# Patient Record
Sex: MALE | Race: BLACK OR AFRICAN AMERICAN | NOT HISPANIC OR LATINO | URBAN - METROPOLITAN AREA
[De-identification: names, ages, dates, MRNs, and addresses within clinical notes are randomized per-mention and may not be internally consistent; named-entity substitution may affect disease eponyms.]

---

## 2019-03-18 PROBLEM — Z00.00 ENCOUNTER FOR PREVENTIVE HEALTH EXAMINATION: Status: ACTIVE | Noted: 2019-03-18

## 2021-11-15 ENCOUNTER — EMERGENCY (EMERGENCY)
Facility: HOSPITAL | Age: 61
LOS: 1 days | Discharge: HOME | End: 2021-11-15
Attending: EMERGENCY MEDICINE
Payer: MEDICAID

## 2021-11-15 VITALS
OXYGEN SATURATION: 98 % | HEIGHT: 64 IN | WEIGHT: 184.97 LBS | HEART RATE: 74 BPM | RESPIRATION RATE: 20 BRPM | SYSTOLIC BLOOD PRESSURE: 163 MMHG | TEMPERATURE: 98 F | DIASTOLIC BLOOD PRESSURE: 99 MMHG

## 2021-11-15 VITALS
OXYGEN SATURATION: 99 % | SYSTOLIC BLOOD PRESSURE: 162 MMHG | RESPIRATION RATE: 20 BRPM | HEART RATE: 58 BPM | TEMPERATURE: 98 F | DIASTOLIC BLOOD PRESSURE: 105 MMHG

## 2021-11-15 PROCEDURE — 99284 EMERGENCY DEPT VISIT MOD MDM: CPT

## 2021-11-15 PROCEDURE — 96372 THER/PROPH/DIAG INJ SC/IM: CPT

## 2021-11-15 PROCEDURE — 99283 EMERGENCY DEPT VISIT LOW MDM: CPT | Mod: 25

## 2021-11-15 RX ORDER — LIDOCAINE 4 G/100G
1 CREAM TOPICAL ONCE
Refills: 0 | Status: COMPLETED | OUTPATIENT
Start: 2021-11-15 | End: 2021-11-15

## 2021-11-15 RX ORDER — KETOROLAC TROMETHAMINE 30 MG/ML
15 SYRINGE (ML) INJECTION ONCE
Refills: 0 | Status: DISCONTINUED | OUTPATIENT
Start: 2021-11-15 | End: 2021-11-15

## 2021-11-15 RX ORDER — ACETAMINOPHEN 500 MG
650 TABLET ORAL ONCE
Refills: 0 | Status: COMPLETED | OUTPATIENT
Start: 2021-11-15 | End: 2021-11-15

## 2021-11-15 RX ADMIN — LIDOCAINE 1 PATCH: 4 CREAM TOPICAL at 11:16

## 2021-11-15 RX ADMIN — Medication 650 MILLIGRAM(S): at 11:16

## 2021-11-15 NOTE — ED PROVIDER NOTE - CLINICAL SUMMARY MEDICAL DECISION MAKING FREE TEXT BOX
Isabella Willis DO PGY-1  61 year old male with PMH HTN and back pain presents with right low back pain for 4 weeks, worse when standing up from sitting. No saddle anesthesia/urinary or stool incontinence. Neuro intact. Back pain likely musculoskeletal, will treat pain and re-evaluate. Likely discharge home with ortho/spine follow up. Isabella Willis DO PGY-1  61 year old male with PMH HTN and back pain presents with right low back pain for 4 weeks, worse when standing up from sitting. No saddle anesthesia/urinary or stool incontinence. Neuro intact. Back pain likely musculoskeletal, will treat pain and re-evaluate. Likely discharge home with ortho/spine follow up.    ROSELINE Andres MD: Agree with resident MDM, assessment and plan as above. PT p/w acute on chronic LBP. Denies focal numbness/weakness, bowel/bladder incontinence, saddle anesthesia, pain radiating down legs, IVDA, f/c. No red flag signs/sx concerning for cauda equina or cord compression. Has sought medical care for this before. PT ambulatory, normal neurologic exam. Plan: pain control, outpt spine f/u with return precautions

## 2021-11-15 NOTE — ED PROVIDER NOTE - OBJECTIVE STATEMENT
61 year old male with PMH HTN, back pain presents with 4 weeks of right sided low back pain, worse with standing up from sitting. Patient states pain is minimal at rest. Improves with walking. Was seen in [facility] 2 weeks ago and prescribed meloxicam 15 mg and methacarbamol 500 mg, which improved symptoms. Denies urinary incontinence, frequency, hematuria, stool incontinence, or saddle anesthesia. Denies fevers, chills, recreational drug use.     PMH: HTN  Meds: metoproplol, amlodipine, meloxicam, methacarbamol  NKDA

## 2021-11-15 NOTE — ED PROVIDER NOTE - PHYSICAL EXAMINATION
PHYSICAL EXAM:  CONSTITUTIONAL: Well appearing, awake, alert, oriented to person, place, time/situation and in no apparent distress.  HEAD: Atraumatic  NECK: Supple, full ROM.  EYES: Clear bilaterally, pupils equal, round and reactive to light.  BACK: No midline spinal tenderness.   ENMT: Airway patent, Nasal mucosa clear. Mouth with normal mucosa. Uvula is midline.   CARDIAC: Normal rate, regular rhythm. +S1/S2. No murmurs, rubs or gallops.  RESPIRATORY: Breathing unlabored.  NEUROLOGICAL: Alert and oriented, no focal deficits, no motor or sensory deficits. Sensation intact x4 extremities. Strength 5/5 of upper and lower extremities B/L.   SKIN: Skin warm and dry. No evidence of rashes or lesions.

## 2021-11-15 NOTE — ED PROVIDER NOTE - NSFOLLOWUPINSTRUCTIONS_ED_ALL_ED_FT
Call [facility] - [phone] for further evaluation and management     Back Pain  WHAT YOU NEED TO KNOW:  Back pain is common. It can be caused by many conditions, such as arthritis or the breakdown of spinal discs. Your risk for back pain is increased by injuries, lack of activity, or repeated bending and twisting. You may feel sore or stiff on one or both sides of your back. The pain may spread to your buttocks or thighs.  DISCHARGE INSTRUCTIONS:  Return to the emergency department if:   •You have pain, numbness, or weakness in one or both legs.  •Your pain becomes so severe that you cannot walk.  •You cannot control your urine or bowel movements.  •You have severe back pain with chest pain.  •You have severe back pain, nausea, and vomiting.  •You have severe back pain that spreads to your side or genital area.  Contact your healthcare provider if:   •You have back pain that does not get better with rest and pain medicine.  •You have a fever.  •You have pain that worsens when you are on your back or when you rest.  •You have pain that worsens when you cough or sneeze.  •You lose weight without trying.  •You have questions or concerns about your condition or care.  Medicines:   •NSAIDs help decrease swelling and pain. This medicine is available with or without a doctor's order. NSAIDs can cause stomach bleeding or kidney problems in certain people. If you take blood thinner medicine, always ask your healthcare provider if NSAIDs are safe for you. Always read the medicine label and follow directions.  •Acetaminophen decreases pain and fever. It is available without a doctor's order. Ask how much to take and how often to take it. Follow directions. Read the labels of all other medicines you are using to see if they also contain acetaminophen, or ask your doctor or pharmacist. Acetaminophen can cause liver damage if not taken correctly. Do not use more than 4 grams (4,000 milligrams) total of acetaminophen in one day.   •Muscle relaxers help decrease muscle spasms and back pain.  •Prescription pain medicine may be given. Ask your healthcare provider how to take this medicine safely. Some prescription pain medicines contain acetaminophen. Do not take other medicines that contain acetaminophen without talking to your healthcare provider. Too much acetaminophen may cause liver damage. Prescription pain medicine may cause constipation. Ask your healthcare provider how to prevent or treat constipation.   •Take your medicine as directed. Contact your healthcare provider if you think your medicine is not helping or if you have side effects. Tell him or her if you are allergic to any medicine. Keep a list of the medicines, vitamins, and herbs you take. Include the amounts, and when and why you take them. Bring the list or the pill bottles to follow-up visits. Carry your medicine list with you in case of an emergency.  How to manage your back pain:   •Apply ice on your back for 15 to 20 minutes every hour or as directed. Use an ice pack, or put crushed ice in a plastic bag. Cover it with a towel before you apply it to your skin. Ice helps prevent tissue damage and decreases pain.  •Apply heat on your back for 20 to 30 minutes every 2 hours for as many days as directed. Heat helps decrease pain and muscle spasms.  •Stay active as much as you can without causing more pain. Bed rest could make your back pain worse. Avoid heavy lifting until your pain is gone.  •Go to physical therapy as directed. A physical therapist can teach you exercises to help improve movement and strength, and to decrease pain.  Follow up with your healthcare provider in 2 weeks, or as directed: Write down your questions so you remember to ask them during your visits. Call [facility] - [phone] for further evaluation and management.    For pain, continue to take your prescribed medications.  Continue all home medications.  Additionally, you can purchase Lidocaine patches over the counter at your local pharmacy.    Back Pain  WHAT YOU NEED TO KNOW:  Back pain is common. It can be caused by many conditions, such as arthritis or the breakdown of spinal discs. Your risk for back pain is increased by injuries, lack of activity, or repeated bending and twisting. You may feel sore or stiff on one or both sides of your back. The pain may spread to your buttocks or thighs.  DISCHARGE INSTRUCTIONS:  Return to the emergency department if:   •You have pain, numbness, or weakness in one or both legs.  •Your pain becomes so severe that you cannot walk.  •You cannot control your urine or bowel movements.  •You have severe back pain with chest pain.  •You have severe back pain, nausea, and vomiting.  •You have severe back pain that spreads to your side or genital area.  Contact your healthcare provider if:   •You have back pain that does not get better with rest and pain medicine.  •You have a fever.  •You have pain that worsens when you are on your back or when you rest.  •You have pain that worsens when you cough or sneeze.  •You lose weight without trying.  •You have questions or concerns about your condition or care.  Medicines:   •NSAIDs help decrease swelling and pain. This medicine is available with or without a doctor's order. NSAIDs can cause stomach bleeding or kidney problems in certain people. If you take blood thinner medicine, always ask your healthcare provider if NSAIDs are safe for you. Always read the medicine label and follow directions.  •Acetaminophen decreases pain and fever. It is available without a doctor's order. Ask how much to take and how often to take it. Follow directions. Read the labels of all other medicines you are using to see if they also contain acetaminophen, or ask your doctor or pharmacist. Acetaminophen can cause liver damage if not taken correctly. Do not use more than 4 grams (4,000 milligrams) total of acetaminophen in one day.   •Muscle relaxers help decrease muscle spasms and back pain.  •Prescription pain medicine may be given. Ask your healthcare provider how to take this medicine safely. Some prescription pain medicines contain acetaminophen. Do not take other medicines that contain acetaminophen without talking to your healthcare provider. Too much acetaminophen may cause liver damage. Prescription pain medicine may cause constipation. Ask your healthcare provider how to prevent or treat constipation.   •Take your medicine as directed. Contact your healthcare provider if you think your medicine is not helping or if you have side effects. Tell him or her if you are allergic to any medicine. Keep a list of the medicines, vitamins, and herbs you take. Include the amounts, and when and why you take them. Bring the list or the pill bottles to follow-up visits. Carry your medicine list with you in case of an emergency.  How to manage your back pain:   •Apply ice on your back for 15 to 20 minutes every hour or as directed. Use an ice pack, or put crushed ice in a plastic bag. Cover it with a towel before you apply it to your skin. Ice helps prevent tissue damage and decreases pain.  •Apply heat on your back for 20 to 30 minutes every 2 hours for as many days as directed. Heat helps decrease pain and muscle spasms.  •Stay active as much as you can without causing more pain. Bed rest could make your back pain worse. Avoid heavy lifting until your pain is gone.  •Go to physical therapy as directed. A physical therapist can teach you exercises to help improve movement and strength, and to decrease pain.  Follow up with your healthcare provider in 2 weeks, or as directed: Write down your questions so you remember to ask them during your visits.

## 2021-11-15 NOTE — ED ADULT NURSE NOTE - OBJECTIVE STATEMENT
61 year old male with PMH HTN, back pain presents with 4 weeks of right sided low back pain, worse with standing up from sitting  PT is alert and oriented x 3.  Pt denies any sob or chest pain.  Denies any fevers, incontinence, or numbness.

## 2021-11-15 NOTE — ED PROVIDER NOTE - PATIENT PORTAL LINK FT
You can access the FollowMyHealth Patient Portal offered by [facility] by registering at the following website: [email or web address]. By joining [facility]’s FollowMyHealth portal, you will also be able to view your health information using other applications (apps) compatible with our system.

## 2021-11-15 NOTE — ED PROVIDER NOTE - NS ED ROS FT
ROS:  -Constitutional: Denies fever  -Head: Denies headache  -Eyes: Denies blurry vision  -Cardiovascular: Denies chest pain  -Pulmonary: Denies shortness of breath  -Gastrointestinal: Denies nausea  -Genitourinary: Denies dysuria  -Skin: Denies new rashes  -Neuro: Denies weakness
No

## 2021-11-15 NOTE — ED PROVIDER NOTE - PROGRESS NOTE DETAILS
Isabella Willis DO PGY-1  Spoke with patient with  ID [id number], Guatemalan creole speaking.  Patient states that symptoms are improved. Discussed plan to follow up with ortho spine clinic, continue home medications, and return precautions given. Isabella Willis DO PGY-1  Patient declined toradol. Isabella Willis DO PGY-1  Spoke with patient with  ID [id number], Georgian creole speaking.  Patient states that symptoms are improved. Discussed plan to follow up with ortho spine clinic, continue home medications, and return precautions given.

## 2021-11-17 PROBLEM — I10 ESSENTIAL (PRIMARY) HYPERTENSION: Chronic | Status: ACTIVE | Noted: 2021-11-15

## 2021-11-23 ENCOUNTER — APPOINTMENT (OUTPATIENT)
Dept: PHYSICAL MEDICINE AND REHAB | Facility: CLINIC | Age: 61
End: 2021-11-23
Payer: MEDICAID

## 2021-11-23 PROCEDURE — 99204 OFFICE O/P NEW MOD 45 MIN: CPT

## 2021-11-26 NOTE — HISTORY OF PRESENT ILLNESS
[FreeTextEntry1] : Portuguese Creole  (Taco) used for purposes of translation ([id number])\par 60 yo M with PMH HTN who presents with low back pain.\par \par Onset: several years and marked by periodic flares, the latest flare started one month ago.  No inciting events, trauma, or falls.\par Location: lower lumbar spine\par Characteristics: sharp \par Aggravating factors: prolonged sitting, standing, walking\par Alleviating factors: rest\par Radiation: right lower extremity\par Treatments: tylenol, NSAIDs, IM toradol injection ([facility] ED), meloxicam and methocarbamol ([facility]) with minimal relief of his pain.  rest, physical therapy, HEP with no improvement.  No previous injections.  No previous surgeries.\par Severity: 7-9/10\par \par Diagnostic studies:\par MRI was done 3 weeks ago but patient unable to recall the facility.  Patient provided me with the phone number for out-patient pain management but unable to retrieve the results on this visit.\par No previous EMG/NCS\par \par Patient denies new weakness, numbness or paresthesia.  Denies bowel/bladder dysfunction, saddle anesthesia, fevers, chills, weight loss, night pain, or night sweats.\par \par 
Patient requests all Lab and Radiology Results on their Discharge Instructions

## 2021-11-26 NOTE — ASSESSMENT
[FreeTextEntry1] : 60 yo M who presents with low back pain with radiation into the right lower extremity consistent with lumbar radiculopathy secondary to lumbosacral stenosis and lumbar disc herniation.\par \par I had an extended discussion with Mr. Phillips on this visit.  Various topics were covered including diagnosis, treatment options, and prognosis.  Relevant anatomy and treatment rationale reviewed.  Anticipatory guidance provided including instructions on how to manage future flares were discussed.  Lastly, red flag signs and symptoms were reviewed and patient instructed to seek immediate medical attention should these arise.\par \par I counseled Mr. Phillips that, at this point in treatment, I recommend undergoing MRI lumbar spine.  Mr. Phillips reports undergoing this study but is unable to recall the facility that he underwent this testing.  He has a phone number for a local pain management physician but we were unable to retrieve these results prior to the conclusion of his visit.\par \par -[facility] ED notes reviewed.\par -Will contact Mr. Phillips's previous pain management physician for a copy of the report for most recent MRI lumbar spine. I will schedule Mr. Phillips to have a follow up in 1-2 weeks to review these results.  I also have asked Mr. Phillips to bring with him a copy of the CD to review MRI imaging on next visit.\par -Start medrol dose pack, dispense 1 pack.  Patient warned to avoid use of PO NSAIDS while on oral steroids.  Possible side effects, including hyperglycemia, GI upset, and GI bleed, reviewed with patient.  Patient instructed to immediately stop medication should she develop any abdominal pain, nausea, vomiting, bloody stools, or BRBPR.  \par -Restart PT/HEP, new referral provided\par -RTC 1-2 weeks as above\par \par Yaya Hinojosa MD\par Spine and Sports Medicine\par \par [employer]\par At [facility]\par \par \par

## 2021-11-26 NOTE — PHYSICAL EXAM
[FreeTextEntry1] : Gen: NAD\par HEENT: neck supple\par CV: no cyanosis\par Pulm: breathing well on room air\par Abd: soft\par Low back: range of motion limited by pain, tenderness to palpation lower lumbar paraspinals and right sciatic notch, +straight leg raise RLE, neg FABERE, neg FAIR\par Right hip: FAROM, non-tender to palpation, neg scour, neg juaquin, neg nelda\par Msk: \par 5/5 hip flexion B/L, 5/5 knee extension B/L, 5/5 knee flexion B/L, 5/5 dorsiflexion B/L, 5/5 EHL B/L, 5/5 plantar flexion B/L\par 5/5 shoulder abduction B/L, 5/5 elbow flexion B/L, 5/5 elbow extension B/L, 5/5 wrist extension B/L, 5/5 hand  B/L\par Neuro: sensation intact to light touch in bilateral upper and lower extremities, reflexes 2+ brachioradialis, biceps, triceps bilaterally, reflexes 2+ patella, medial hamstring, achilles bilaterally, negative babinski, negative calloway\par

## 2021-12-07 ENCOUNTER — APPOINTMENT (OUTPATIENT)
Dept: PHYSICAL MEDICINE AND REHAB | Facility: CLINIC | Age: 61
End: 2021-12-07

## 2022-02-16 ENCOUNTER — APPOINTMENT (OUTPATIENT)
Dept: PHYSICAL MEDICINE AND REHAB | Facility: CLINIC | Age: 62
End: 2022-02-16
Payer: MEDICAID

## 2022-02-16 PROCEDURE — 99214 OFFICE O/P EST MOD 30 MIN: CPT

## 2022-02-16 RX ORDER — METHYLPREDNISOLONE 4 MG/1
4 TABLET ORAL
Qty: 1 | Refills: 0 | Status: ACTIVE | COMMUNITY
Start: 2021-11-23 | End: 1900-01-01

## 2022-02-16 NOTE — HISTORY OF PRESENT ILLNESS
[FreeTextEntry1] : Patient saw Dr. Hinojosa in November and was prescribed a Medrol pack for his lower back pain which helped significantly.  He is not interested in any injections.  He would like to take the same medication as his pain is starting to return.   [Back] : back [Other: ___] : [unfilled] [___ mths] : [unfilled] month(s) ago [6] : a current pain level of 6/10 [Sharp] : sharp [Laying] : laying [Insomnia] : insomnia [Gait Dysfunction] : gait dysfunction [PT] : PT [Medications] : medications [FreeTextEntry6] : Medrol pack

## 2022-02-16 NOTE — DATA REVIEWED
[MRI] : MRI [FreeTextEntry1] : MRI lumbar spine done September 2021 showed evidence of protrusion at L5-S1 with moderate neural foraminal stenosis.  Mild facet hypertrophy L3-S1.

## 2022-02-16 NOTE — ASSESSMENT
[FreeTextEntry1] : 62 year old male with low back pain.  I will prescribe Medrol pack for him as it has worked in the past.  Side effects discussed.

## 2022-04-19 ENCOUNTER — APPOINTMENT (OUTPATIENT)
Dept: PHYSICAL MEDICINE AND REHAB | Facility: CLINIC | Age: 62
End: 2022-04-19
Payer: MEDICAID

## 2022-04-19 VITALS
TEMPERATURE: 94.5 F | OXYGEN SATURATION: 99 % | SYSTOLIC BLOOD PRESSURE: 162 MMHG | DIASTOLIC BLOOD PRESSURE: 84 MMHG | HEART RATE: 70 BPM

## 2022-04-19 DIAGNOSIS — M51.36 OTHER INTERVERTEBRAL DISC DEGENERATION, LUMBAR REGION: ICD-10-CM

## 2022-04-19 DIAGNOSIS — M48.061 SPINAL STENOSIS, LUMBAR REGION WITHOUT NEUROGENIC CLAUDICATION: ICD-10-CM

## 2022-04-19 DIAGNOSIS — M54.16 RADICULOPATHY, LUMBAR REGION: ICD-10-CM

## 2022-04-19 PROCEDURE — 99213 OFFICE O/P EST LOW 20 MIN: CPT

## 2022-04-19 RX ORDER — GABAPENTIN 100 MG/1
100 CAPSULE ORAL
Qty: 90 | Refills: 2 | Status: ACTIVE | COMMUNITY
Start: 2022-04-19 | End: 1900-01-01

## 2022-04-19 NOTE — HISTORY OF PRESENT ILLNESS
[FreeTextEntry1] : Patient returns after being seen in February.  He states his pain has returned.  He is here to discuss his options.

## 2022-04-19 NOTE — ASSESSMENT
[FreeTextEntry1] : 62 year old male with low back and lumbar radicular pain now returned.  As he has already taken 2 medrol packs in the last 6 months, I will not prescribe any additional oral steroids for him.  He will begin with a course of medically supervised PT.  I will provide him with a prescription.  We will also begin a trial of Gabapentin.  Side effects discussed.

## 2022-10-11 ENCOUNTER — APPOINTMENT (OUTPATIENT)
Dept: GASTROENTEROLOGY | Facility: CLINIC | Age: 62
End: 2022-10-11

## 2024-07-26 ENCOUNTER — EMERGENCY (EMERGENCY)
Facility: HOSPITAL | Age: 64
LOS: 1 days | Discharge: HOME | End: 2024-07-26
Admitting: STUDENT IN AN ORGANIZED HEALTH CARE EDUCATION/TRAINING PROGRAM
Payer: SELF-PAY

## 2024-07-26 VITALS
WEIGHT: 190.04 LBS | OXYGEN SATURATION: 98 % | TEMPERATURE: 99 F | DIASTOLIC BLOOD PRESSURE: 104 MMHG | RESPIRATION RATE: 17 BRPM | HEART RATE: 77 BPM | SYSTOLIC BLOOD PRESSURE: 175 MMHG

## 2024-07-26 LAB
APPEARANCE UR: CLEAR — SIGNIFICANT CHANGE UP
BILIRUB UR-MCNC: NEGATIVE — SIGNIFICANT CHANGE UP
COLOR SPEC: YELLOW — SIGNIFICANT CHANGE UP
DIFF PNL FLD: NEGATIVE — SIGNIFICANT CHANGE UP
GLUCOSE UR QL: >=1000 MG/DL
KETONES UR-MCNC: NEGATIVE MG/DL — SIGNIFICANT CHANGE UP
LEUKOCYTE ESTERASE UR-ACNC: NEGATIVE — SIGNIFICANT CHANGE UP
NITRITE UR-MCNC: NEGATIVE — SIGNIFICANT CHANGE UP
PH UR: 7.5 — SIGNIFICANT CHANGE UP (ref 5–8)
PROT UR-MCNC: NEGATIVE MG/DL — SIGNIFICANT CHANGE UP
SP GR SPEC: 1.02 — SIGNIFICANT CHANGE UP (ref 1–1.03)
UROBILINOGEN FLD QL: 1 MG/DL — SIGNIFICANT CHANGE UP (ref 0.2–1)

## 2024-07-26 PROCEDURE — 99284 EMERGENCY DEPT VISIT MOD MDM: CPT

## 2024-07-26 NOTE — ED PROVIDER NOTE - DATE/TIME 1
[employer], was evaluated through a synchronous (real-time) audio-video encounter. The patient (or guardian if applicable) is aware that this is a billable service, which includes applicable co-pays. This Virtual Visit was conducted with patient's (and/or legal guardian's) consent. Patient identification was verified, and a caregiver was present when appropriate.   The patient was located at Home: [address] [place] [id number]  Provider [facility] [place]      Car Brothers (:  1968) is a Established patient, presenting virtually for evaluation of the following:    Assessment & Plan   Below is the assessment and plan developed based on review of pertinent history, physical exam, labs, studies, and medications.  1. Restless leg syndrome  -     rOPINIRole (REQUIP) 2 MG tablet; Take 1 tablet by mouth nightly, Disp-30 tablet, R-3Normal  2. Dry cough  I think just taking the Mucinex is fine for now  No follow-ups on file.       Subjective   HPI  Review of Systems   He is being seen today stating he no longer is seeing his sleep doctor and he needs refills of the medication he currently is taking 2 mg of Requip at night for restless leg syndrome along with 600 of gabapentin it seems to be working well for him and he would like to continue taking that  He also states for the last week he has had a bit of a dry cough absolutely no other symptoms along with that wondered if there is anything else he should be doing other than taking Mucinex    Objective   Patient-Reported Vitals  No data recorded     Physical Exam  [INSTRUCTIONS:  \"[x]\" Indicates a positive item  \"[]\" Indicates a negative item  -- DELETE ALL ITEMS NOT EXAMINED]    Constitutional: [x] Appears well-developed and well-nourished [x] No apparent distress      [] Abnormal -     Mental status: [x] Alert and awake  [x] Oriented to person/place/time [x] Able to follow commands    [] Abnormal -     Eyes:   EOM    [x]  
26-Jul-2024 18:23

## 2024-07-26 NOTE — ED PROVIDER NOTE - PATIENT PORTAL LINK FT
You can access the FollowMyHealth Patient Portal offered by [facility] by registering at the following website: [email or web address]. By joining [facility]’s FollowMyHealth portal, you will also be able to view your health information using other applications (apps) compatible with our system.

## 2024-07-26 NOTE — ED ADULT NURSE NOTE - OBJECTIVE STATEMENT
Patient received wellness, a&ox4, ambulatory. c/o urinary frequency x 1week. Pt denies chest pain, sob, hematuria, dysuria. Breathing even, unlabored. Urine collected and sent.

## 2024-07-26 NOTE — ED PROVIDER NOTE - OBJECTIVE STATEMENT
63 yo male with PMHx of HTN presenting to ED with complaints of increased urinary frequency. Patient denies new medication, no recent illness, no dysuria, no fever.

## 2024-07-26 NOTE — ED PROVIDER NOTE - NSFOLLOWUPINSTRUCTIONS_ED_ALL_ED_FT
Urinary Frequency, Adult  Urinary frequency means urinating more often than usual. You may urinate every 1–2 hours even though you drink a normal amount of fluid and do not have a bladder infection or condition. Although you urinate more often than normal, the total amount of urine produced in a day is normal.    With urinary frequency, you may have an urgent need to urinate often. The stress and anxiety of needing to find a bathroom quickly can make this urge worse. This condition may go away on its own, or you may need treatment at home. Home treatment may include bladder training, exercises, taking medicines, or making changes to your diet.    Follow these instructions at home:  Bladder health      Your health care provider will tell you what to do to improve bladder health. You may be told to:  Keep a bladder diary. Keep track of:  What you eat and drink.  How often you urinate.  How much you urinate.  Follow a bladder training program. This may include:  Learning to delay going to the bathroom.  Double urinating, also called voiding. This helps if you are not completely emptying your bladder.  Scheduled voiding.  Do Kegel exercises. Kegel exercises strengthen the muscles that help control urination, which may help the condition.  Eating and drinking    Follow instructions from your health care provider about eating or drinking restrictions. You may be told to:  Avoid caffeine.  Drink fewer fluids, especially alcohol.  Avoid drinking in the evening.  Avoid foods or drinks that may irritate the bladder. These include coffee, tea, soda, artificial sweeteners, citrus, tomato-based foods, and chocolate.  Eat foods that help prevent or treat constipation. Constipation can make urinary frequency worse. You may need to take these actions to prevent or treat constipation:  Drink enough fluid to keep your urine pale yellow.  Take over-the-counter or prescription medicines.  Eat foods that are high in fiber, such as beans, whole grains, and fresh fruits and vegetables.  Limit foods that are high in fat and processed sugars, such as fried or sweet foods.  General instructions    Take over-the-counter and prescription medicines only as told by your health care provider.  Keep all follow-up visits. This is important.  Contact a health care provider if:  You start urinating more often.  You feel pain or irritation when you urinate.  You notice blood in your urine.  Your urine looks cloudy.  You develop a fever.  You begin vomiting.  Get help right away if:  You are unable to urinate.  Summary  Urinary frequency means urinating more often than usual. With urinary frequency, you may urinate every 1–2 hours even though you drink a normal amount of fluid and do not have a bladder infection or other bladder condition.  Your health care provider may recommend that you keep a bladder diary, follow a bladder training program, or make dietary changes.  If told by your health care provider, do Kegel exercises to strengthen the muscles that help control urination.  Take over-the-counter and prescription medicines only as told by your health care provider.  Contact a health care provider if your symptoms do not improve or get worse.  This information is not intended to replace advice given to you by your health care provider. Make sure you discuss any questions you have with your health care provider.

## 2024-07-26 NOTE — ED PROVIDER NOTE - PROGRESS NOTE DETAILS
Patient to follow up with PMD.     Pt is well appearing walking with steady gait, stable for discharge and follow up without fail with medical doctor. I had a detailed discussion with the patient and/or guardian regarding the historical points, exam findings, and any diagnostic results supporting the discharge diagnosis. Pt educated on care and need for follow up. Strict return instructions and red flag signs and symptoms discussed with patient. Questions answered. Pt shows understanding of discharge information and agrees to follow.

## 2024-07-26 NOTE — ED ADULT TRIAGE NOTE - CHIEF COMPLAINT QUOTE
Pt reporting to the ED for increased urinary frequency X 1 week. Denies pain or hematuria. pmh of HTN.

## 2024-12-04 NOTE — ED ADULT TRIAGE NOTE - WEIGHT IN LBS
Hypertension is currently: Poorly controlled. Hypertension Symptoms:peripheral edema. Blood Pressure: Per ACC/AHA 2017 Hypertension guidelines, goal is <130/80 mmHg. At Goal?: No. Medication adherence: The patient reports adherence to this regimen. Medication changes: Continue current medications.. Maintenance and Self Management: Self management goals: weight reduction, eat healthy foods, reduce sodium intake, increase physical activity, blood pressure monitoring, and medication adherence. Does the patient display any deficiencies regarding self management: No. Patient's readiness to change: (Preparation) prepared to experiment with small changes. Patient has knowledge deficit regarding: None. Topics reviewed with patient: Risk factors, Complications, Sources of information, Symptoms of disease diagnosis, Attitudes and beliefs towards medications, Adverse drug reactions, Drug side effects, and Factors influencing adherence. Additional referrals:  none.  Note: BP above goal, possibly cause of lower extremity scant pitting edema, possibly from albuminemia, HF considered, will likely need medication changes but need to wait on labs for renal function and electrolytes, will call with results, follow up in 2 weeks. UPDATE: renal function stable. Opted to change lisinopril 20 mg daily to amlodipine-olmesartan 5-40 mg daily, follow up in 2 weeks.  Orders:    Comprehensive Metabolic Panel; Future    amLODIPine-Olmesartan 5-40 MG Tab; Take 1 tablet by mouth daily.     184.9